# Patient Record
(demographics unavailable — no encounter records)

---

## 2025-07-24 NOTE — PHYSICAL EXAM
[Nasal Endoscopy Performed] : nasal endoscopy was performed, see procedure section for findings [Normal] : no masses and lesions seen, face is symmetric [de-identified] :  Large inferior turbinates; positive Afrin test  [de-identified] : copious clear

## 2025-07-24 NOTE — HISTORY OF PRESENT ILLNESS
[de-identified] : Chronic yellow nasal drainage with midfacial pressure. Seasonal runny nose and sneezing- also to dust. Consistently blocked nose- this alternates. Prior sinus infections a few years ago have abated.

## 2025-07-24 NOTE — ASSESSMENT
[FreeTextEntry1] : We discussed a possible empiric trial of abx & oral steroids but elected to image his sinuses first. RTC when done.   We discussed allergen mitigation and provided the patient with the corresponding educational handout; reviewed proper nasal steroid administration technique. Trial fluticasone.